# Patient Record
Sex: OTHER/UNKNOWN | Race: OTHER | URBAN - METROPOLITAN AREA
[De-identification: names, ages, dates, MRNs, and addresses within clinical notes are randomized per-mention and may not be internally consistent; named-entity substitution may affect disease eponyms.]

---

## 2022-06-01 ENCOUNTER — HOSPITAL ENCOUNTER (EMERGENCY)
Facility: MEDICAL CENTER | Age: 22
End: 2022-06-01
Attending: EMERGENCY MEDICINE

## 2022-06-01 VITALS
HEART RATE: 94 BPM | TEMPERATURE: 100 F | WEIGHT: 136.91 LBS | DIASTOLIC BLOOD PRESSURE: 76 MMHG | OXYGEN SATURATION: 99 % | SYSTOLIC BLOOD PRESSURE: 144 MMHG | RESPIRATION RATE: 18 BRPM

## 2022-06-01 DIAGNOSIS — B34.9 VIRAL SYNDROME: ICD-10-CM

## 2022-06-01 DIAGNOSIS — J45.21 MILD INTERMITTENT ASTHMA WITH ACUTE EXACERBATION: ICD-10-CM

## 2022-06-01 DIAGNOSIS — H66.92 LEFT ACUTE OTITIS MEDIA: ICD-10-CM

## 2022-06-01 PROCEDURE — 99283 EMERGENCY DEPT VISIT LOW MDM: CPT

## 2022-06-01 PROCEDURE — A9270 NON-COVERED ITEM OR SERVICE: HCPCS | Performed by: EMERGENCY MEDICINE

## 2022-06-01 PROCEDURE — 700102 HCHG RX REV CODE 250 W/ 637 OVERRIDE(OP): Performed by: EMERGENCY MEDICINE

## 2022-06-01 PROCEDURE — 700101 HCHG RX REV CODE 250: Performed by: EMERGENCY MEDICINE

## 2022-06-01 PROCEDURE — 94640 AIRWAY INHALATION TREATMENT: CPT

## 2022-06-01 PROCEDURE — 94760 N-INVAS EAR/PLS OXIMETRY 1: CPT

## 2022-06-01 PROCEDURE — 700111 HCHG RX REV CODE 636 W/ 250 OVERRIDE (IP): Performed by: EMERGENCY MEDICINE

## 2022-06-01 RX ORDER — ALBUTEROL SULFATE 90 UG/1
2 AEROSOL, METERED RESPIRATORY (INHALATION) EVERY 6 HOURS PRN
Qty: 18 G | Refills: 1 | Status: SHIPPED | OUTPATIENT
Start: 2022-06-01

## 2022-06-01 RX ORDER — PREDNISONE 20 MG/1
60 TABLET ORAL DAILY
Qty: 12 TABLET | Refills: 0 | Status: SHIPPED | OUTPATIENT
Start: 2022-06-01 | End: 2022-06-05

## 2022-06-01 RX ORDER — AMOXICILLIN 250 MG/1
500 CAPSULE ORAL ONCE
Status: COMPLETED | OUTPATIENT
Start: 2022-06-01 | End: 2022-06-01

## 2022-06-01 RX ORDER — AMOXICILLIN 500 MG/1
500 TABLET, FILM COATED ORAL 3 TIMES DAILY
Qty: 21 TABLET | Refills: 0 | Status: SHIPPED | OUTPATIENT
Start: 2022-06-01

## 2022-06-01 RX ADMIN — PREDNISONE 60 MG: 50 TABLET ORAL at 12:42

## 2022-06-01 RX ADMIN — AMOXICILLIN 500 MG: 250 CAPSULE ORAL at 12:42

## 2022-06-01 RX ADMIN — ALBUTEROL SULFATE 2.5 MG: 2.5 SOLUTION RESPIRATORY (INHALATION) at 12:47

## 2022-06-01 NOTE — FLOWSHEET NOTE
06/01/22 1247   Events/Summary/Plan   Events/Summary/Plan albuterol neb given   Vital Signs   Pulse 76   Respiration 20   Pulse Oximetry 100 %   $ Pulse Oximetry (Spot Check) Yes   Respiratory Assessment   Level of Consciousness Alert   Chest Exam   Work Of Breathing / Effort Within Normal Limits   Breath Sounds   RUL Breath Sounds Clear   RML Breath Sounds Expiratory Wheezes   RLL Breath Sounds Expiratory Wheezes   TIGRE Breath Sounds Clear   LLL Breath Sounds Expiratory Wheezes   Secretions   Cough Productive   How Sputum Obtained Spontaneous   Sputum Amount Unable to Evaluate

## 2022-06-01 NOTE — ED PROVIDER NOTES
ED Provider Note    Scribed for Chato Vizcaino M.D. by Socorro Nagel. 6/1/2022  12:22 PM    Primary care provider: Pcp Pt States None  Means of arrival: Walk-in  History obtained from: Patient  History limited by: None    CHIEF COMPLAINT  Chief Complaint   Patient presents with   • Flu Like Symptoms     Pt walk-in with spouse. Pt c/o fever, chills, clogged bilat ears, chest pain, wheezing, sores in mouth, runny nose x 8 days.        HPI  Sumi Phillips (they/them) is a 22 y.o. adult who presents to the Emergency Department for fevers onset 8 days ago. They are additionally experiencing rhinorrhea, left ear pain, cough, shortness of breath, and sore throat. They deny abdominal pain, nausea, vomiting, and diarrhea. They have been diagnosed with asthma and pneumonia in the past. They are using albuterol with little to no alleviation. They deny using any steroid inhalers. They have been hospitalized for asthma exacerbation in the past. They were previously tested for COVID-19, Influenza, and RSV which resulted negative. They deny any known allergies to medications.    REVIEW OF SYSTEMS  Pertinent positives include: fevers, rhinorrhea, left ear pain, cough, shortness of breath, and sore throat.  Pertinent negatives include: abdominal pain, nausea, vomiting, and diarrhea.    PAST MEDICAL HISTORY  Past Medical History:   Diagnosis Date   • Asthma      SOCIAL HISTORY  Social History     Tobacco Use   • Smoking status: Never Smoker   • Smokeless tobacco: Never Used   Vaping Use   • Vaping Use: Every day   • Substances: Nicotine   Substance Use Topics   • Alcohol use: Yes     Comment: 1/week   • Drug use: Not Currently     Types: Inhaled     Comment: marijuana     Social History     Substance and Sexual Activity   Drug Use Not Currently   • Types: Inhaled    Comment: marijuana       CURRENT MEDICATIONS  Home Medications     Reviewed by Nadege Apple R.N. (Registered Nurse) on 06/01/22 at 1202  Med List Status: Not  Addressed   Medication Last Dose Status        Patient Ori Taking any Medications                       ALLERGIES  Allergies   Allergen Reactions   • Iodine Rash     Rash        PHYSICAL EXAM  VITAL SIGNS: /89   Pulse 75   Temp 37.8 °C (100 °F) (Temporal)   Resp 16   Wt 62.1 kg (136 lb 14.5 oz)   SpO2 97%   Reviewed and borderline febrile, no hypoxia room air  Constitutional: Well developed, Well nourished, In no acute distress.  HENT: Normocephalic, atraumatic, bilateral external ears normal, Erythema of the right ear canal, effusion and erythema of the left TM. Patient wearing a mask.  No intraoral erythema edema or exudate  Eyes: PERRLA, conjunctiva pink, no scleral icterus.   Cardiovascular: Regular rate and rhythm. No murmurs, rubs or gallops.  No dependent edema or calf tenderness  Respiratory: Very fine rare wheezes,  Good breath sounds, bilaterally. No rales, or rhonchi.  Abdominal:  Abdomen soft, non-tender, non distended. No rebound, or guarding.    Skin: No erythema, no rash.   Musculoskeletal: no deformities.   Neurologic: Alert & oriented x 3, cranial nerves 2-12 intact by passive exam.  No focal deficit noted.  Psychiatric: Affect normal, Judgment normal, Mood normal.     DIFFERENTIAL DIAGNOSIS:  Pneumonia, asthma, COVID-19, influenza, other viral illness, otitis media    INTERVENTIONS:  Medications   albuterol (PROVENTIL) 2.5mg/0.5ml nebulizer solution 2.5 mg (2.5 mg Nebulization Given 6/1/22 1247)   predniSONE (DELTASONE) tablet 60 mg (60 mg Oral Given 6/1/22 1242)   amoxicillin (AMOXIL) capsule 500 mg (500 mg Oral Given 6/1/22 1242)       ED COURSE:  Nursing notes, VS, PMSFHx reviewed in chart.     12:22 PM - Patient seen and examined at bedside. Patient will be treated with albuterol 2.5 mg/0.5 ml nebulizer solution 2.5 mg, prednisone 60 mg, amoxicillin 500 mg for Sumi Phillips's symptoms.     I informed her about the plan for discharge at this time with a prescription for  prednisone, amoxicillin, and albuterol. We also discussed the return precautions. Patient verbalizes understanding and agreement to this plan of care.      MEDICAL DECISION MAKING:  Well-appearing patient presents with a viral syndrome and a mild acute asthma exacerbation.  There is evidence of some Q-tip trauma to the right external auditory canal and evidence of otitis media in the left ear.  Since I will treat this with an antibiotic I will not obtain a chest x-ray to exclude pneumonia.    PLAN:  Discharge Medication List as of 6/1/2022  1:16 PM      START taking these medications    Details   Amoxicillin 500 MG Tab Take 1 Tablet by mouth 3 times a day., Disp-21 Tablet, R-0, Normal      predniSONE (DELTASONE) 20 MG Tab Take 3 Tablets by mouth every day for 4 days., Disp-12 Tablet, R-0, Normal      albuterol 108 (90 Base) MCG/ACT Aero Soln inhalation aerosol Inhale 2 Puffs every 6 hours as needed for Shortness of Breath., Disp-18 g, R-1, Normal           Asthma handout given  Return for worsening shortness of breath, fevers, vomiting, or other concerns    [facility], Emergency Dept  [address]  [facility] [id number] [phone]    As needed for worsening shortness of breath or ill appearance      CONDITION: good.     FINAL IMPRESSION  1. Mild intermittent asthma with acute exacerbation    2. Left acute otitis media    3. Viral syndrome          Socorro JEROME (Scribmary ann), am scribing for, and in the presence of, Chato Vizcaino M.D..    Electronically signed by: Socorro Nagel (Scribe), 6/1/2022    Chato JEROME M.D. personally performed the services described in this documentation, as scribed by Socorro Nagel in my presence, and it is both accurate and complete.    The note accurately reflects work and decisions made by me.  Chato Vizcaino M.D.  6/1/2022  4:55 PM

## 2022-06-01 NOTE — ED TRIAGE NOTES
Chief Complaint   Patient presents with   • Flu Like Symptoms     Pt walk-in with spouse. Pt c/o fever, chills, clogged bilat ears, chest pain, wheezing, sores in mouth, runny nose x 8 days.      /89   Pulse 75   Temp 37.8 °C (100 °F) (Temporal)   Resp 16   Wt 62.1 kg (136 lb 14.5 oz)   SpO2 97% RA    Has this patient been vaccinated for COVID?  YES  If not, would they like to be vaccinated while in the ER if eligible?  -  Would the patient like to speak with the ERP about the possibility of receiving the COVID vaccine today before making a decision?  -

## 2022-06-01 NOTE — DISCHARGE INSTRUCTIONS
Asthma /Acute Bronchospasm     Use albuterol every 4-6 hours for shortness of breath, wheeze and cough.  Take steroid as prescribed.  Return for worsening shortness of breath, ill appearance or if you need to use albuterol more often than every 4 hours.  See your doctor in 2 days if not much better.  Take antibiotic as prescribed.    You had an elevated or high normal blood pressure reading today.  This does not necessarily mean you have hypertension.  Please followup with your/a primary physician for comprehensive blood pressure evaluation.  BP Readings from Last 3 Encounters:   06/01/22 131/89       Your exam shows you have asthma, or acute bronchospasm that acts like asthma. Bronchospasm means your air passages become narrowed. These conditions are due to inflammation and airway spasm that cause narrowing of the bronchial tubes in the lungs. This causes you to have wheezing and shortness of breath.     CAUSES  Respiratory infections and allergies most often bring on these attacks. Smoking, air pollution, cold air, emotional upsets, and vigorous exercise can also bring them on.      TREATMENT  Ø Treatment is aimed at making the narrowed airways larger. Mild asthma/bronchospasm is usually controlled with inhaled medicines. Albuterol is a common medicine that you breathe in to open spastic or narrowed airways. Some trade names for albuterol are Ventolin or Proventil.  Steroid medicine is also used to reduce the inflammation when an attack is moderate or severe. Antibiotics (medications used to kill germs) are only used if a bacterial infection is present.   Ø If you are pregnant and need to use Albuterol (Ventolin or Proventil), you can expect the baby to move more than usual shortly after the medicine is used.      HOME CARE INSTRUCTIONS  Ø Rest.  Ø Drink plenty of liquids. This helps the mucous to remain thin and easily coughed up. Do not use caffeine or alcohol.  Ø Do not smoke. Avoid being exposed to second-hand  smoke.    Ø You play a critical role in keeping yourself in good health. Avoid exposure to things that cause you to wheeze. Avoid exposure to things that cause you to have breathing problems. Keep your medications up-to-date and available. Carefully follow your doctor’s treatment plan.      Ø When pollen or pollution is bad, keep windows closed and use an air conditioner go to places with air conditioning. If you are allergic to furry pets or birds, find new homes for them or keep them outside.  Ø Take your medicine exactly as prescribed.  Ø Asthma requires careful medical attention. See your caregiver for follow-up as advised. If you are  more than 24 weeks pregnant and you were prescribed any new medications, let your Obstetrician know about the visit and how you are doing. Arrange a recheck.     SEEK IMMEDIATE MEDICAL CARE IF:  Ø You are getting worse.Ø You have trouble breathing. If severe, call 911.  Ø You develop chest pain or discomfort.  Ø You are throwing up or not drinking fluids. Ø You are not getting better within 24 hours.Ø You are coughing up yellow, green, brown, or bloody sputum.  Ø You develop a fever over 102º F (38.9º C).  Ø You have trouble swallowing.      Document Released: 04/03/2008  Document Re-Released: 06/15/2009  [employer]® Patient Information ©2009 [employer].